# Patient Record
Sex: MALE | Race: BLACK OR AFRICAN AMERICAN | NOT HISPANIC OR LATINO | Employment: UNEMPLOYED | URBAN - NONMETROPOLITAN AREA
[De-identification: names, ages, dates, MRNs, and addresses within clinical notes are randomized per-mention and may not be internally consistent; named-entity substitution may affect disease eponyms.]

---

## 2022-11-25 ENCOUNTER — APPOINTMENT (EMERGENCY)
Dept: CT IMAGING | Facility: HOSPITAL | Age: 42
End: 2022-11-25

## 2022-11-25 ENCOUNTER — HOSPITAL ENCOUNTER (EMERGENCY)
Facility: HOSPITAL | Age: 42
Discharge: HOME | End: 2022-11-25
Attending: EMERGENCY MEDICINE

## 2022-11-25 VITALS
TEMPERATURE: 96.5 F | SYSTOLIC BLOOD PRESSURE: 139 MMHG | OXYGEN SATURATION: 97 % | DIASTOLIC BLOOD PRESSURE: 102 MMHG | RESPIRATION RATE: 18 BRPM | WEIGHT: 175 LBS | HEART RATE: 89 BPM

## 2022-11-25 DIAGNOSIS — Z78.9 ALCOHOL USE: ICD-10-CM

## 2022-11-25 DIAGNOSIS — K29.80 DUODENITIS: Primary | ICD-10-CM

## 2022-11-25 LAB
ALBUMIN SERPL BCP-MCNC: 3.2 G/DL (ref 3.5–5)
ALP SERPL-CCNC: 114 U/L (ref 46–116)
ALT SERPL W P-5'-P-CCNC: 105 U/L (ref 12–78)
ANION GAP SERPL CALCULATED.3IONS-SCNC: 10 MMOL/L (ref 4–13)
BASOPHILS # BLD AUTO: 0.05 THOUSANDS/ÂΜL (ref 0–0.1)
BASOPHILS NFR BLD AUTO: 1 % (ref 0–1)
BILIRUB SERPL-MCNC: 0.66 MG/DL (ref 0.2–1)
BUN SERPL-MCNC: 12 MG/DL (ref 5–25)
CALCIUM ALBUM COR SERPL-MCNC: 9.2 MG/DL (ref 8.3–10.1)
CALCIUM SERPL-MCNC: 8.6 MG/DL (ref 8.3–10.1)
CHLORIDE SERPL-SCNC: 100 MMOL/L (ref 96–108)
CO2 SERPL-SCNC: 24 MMOL/L (ref 21–32)
CREAT SERPL-MCNC: 1.14 MG/DL (ref 0.6–1.3)
EOSINOPHIL # BLD AUTO: 0.01 THOUSAND/ÂΜL (ref 0–0.61)
EOSINOPHIL NFR BLD AUTO: 0 % (ref 0–6)
ERYTHROCYTE [DISTWIDTH] IN BLOOD BY AUTOMATED COUNT: 14.6 % (ref 11.6–15.1)
ETHANOL SERPL-MCNC: <3 MG/DL (ref 0–3)
GFR SERPL CREATININE-BSD FRML MDRD: 79 ML/MIN/1.73SQ M
GLUCOSE SERPL-MCNC: 122 MG/DL (ref 65–140)
HCT VFR BLD AUTO: 40.5 % (ref 36.5–49.3)
HGB BLD-MCNC: 14 G/DL (ref 12–17)
HOLD SPECIMEN: NORMAL
IMM GRANULOCYTES # BLD AUTO: 0.01 THOUSAND/UL (ref 0–0.2)
IMM GRANULOCYTES NFR BLD AUTO: 0 % (ref 0–2)
LIPASE SERPL-CCNC: 391 U/L (ref 73–393)
LYMPHOCYTES # BLD AUTO: 1.9 THOUSANDS/ÂΜL (ref 0.6–4.47)
LYMPHOCYTES NFR BLD AUTO: 32 % (ref 14–44)
MCH RBC QN AUTO: 24.6 PG (ref 26.8–34.3)
MCHC RBC AUTO-ENTMCNC: 34.6 G/DL (ref 31.4–37.4)
MCV RBC AUTO: 71 FL (ref 82–98)
MONOCYTES # BLD AUTO: 0.65 THOUSAND/ÂΜL (ref 0.17–1.22)
MONOCYTES NFR BLD AUTO: 11 % (ref 4–12)
NEUTROPHILS # BLD AUTO: 3.41 THOUSANDS/ÂΜL (ref 1.85–7.62)
NEUTS SEG NFR BLD AUTO: 56 % (ref 43–75)
NRBC BLD AUTO-RTO: 0 /100 WBCS
PLATELET # BLD AUTO: 205 THOUSANDS/UL (ref 149–390)
PMV BLD AUTO: 10.7 FL (ref 8.9–12.7)
POTASSIUM SERPL-SCNC: 3.7 MMOL/L (ref 3.5–5.3)
PROT SERPL-MCNC: 7.8 G/DL (ref 6.4–8.4)
RBC # BLD AUTO: 5.69 MILLION/UL (ref 3.88–5.62)
SODIUM SERPL-SCNC: 134 MMOL/L (ref 135–147)
WBC # BLD AUTO: 6.03 THOUSAND/UL (ref 4.31–10.16)

## 2022-11-25 RX ORDER — MAGNESIUM HYDROXIDE/ALUMINUM HYDROXICE/SIMETHICONE 120; 1200; 1200 MG/30ML; MG/30ML; MG/30ML
30 SUSPENSION ORAL ONCE
Status: COMPLETED | OUTPATIENT
Start: 2022-11-25 | End: 2022-11-25

## 2022-11-25 RX ORDER — PANTOPRAZOLE SODIUM 40 MG/1
40 TABLET, DELAYED RELEASE ORAL DAILY
Qty: 30 TABLET | Refills: 0 | Status: SHIPPED | OUTPATIENT
Start: 2022-11-25

## 2022-11-25 RX ADMIN — ALUMINUM HYDROXIDE, MAGNESIUM HYDROXIDE, AND DIMETHICONE 30 ML: 200; 20; 200 SUSPENSION ORAL at 16:46

## 2022-11-25 RX ADMIN — IOHEXOL 100 ML: 350 INJECTION, SOLUTION INTRAVENOUS at 16:39

## 2022-11-25 NOTE — DISCHARGE INSTRUCTIONS
Please follow-up with primary care physician next available appointment  Take medications as directed and avoid excessive alcohol intake, cigarette smoking  Return to the emergency department if condition worsens   used

## 2022-11-26 NOTE — ED PROVIDER NOTES
History  Chief Complaint   Patient presents with   • Abdominal Pain     Abdominal pain into back for 1 week decreased appetite last alcoholic drink was yesterday 2100     Patient is a 72-year-old male presenting with generalized abdominal tenderness  Patient states symptoms have been present approximately 1 week  He states symptoms worsened when he consumes alcohol in large quantities  Patient states he has a few beers a few shots maybe every other day and has been doing that for quite some time  No previous abdominal surgeries  Had an episode of vomiting approximately 6 days ago  Denies fevers or chills  No diarrhea or constipation  No chest pain or shortness of breath  No headaches  Slight decrease in p o  Intake  None       History reviewed  No pertinent past medical history  History reviewed  No pertinent surgical history  History reviewed  No pertinent family history  I have reviewed and agree with the history as documented  E-Cigarette/Vaping   • E-Cigarette Use Never User      E-Cigarette/Vaping Substances     Social History     Tobacco Use   • Smoking status: Every Day     Types: Cigarettes   • Smokeless tobacco: Never   Vaping Use   • Vaping Use: Never used   Substance Use Topics   • Alcohol use: Yes     Comment: cedric;y last drink yesterday at 2100   • Drug use: Never       Review of Systems   Constitutional: Negative for activity change, appetite change, chills and fever  HENT: Negative for ear pain, hearing loss, rhinorrhea, sneezing, sore throat and trouble swallowing  Eyes: Negative for pain and visual disturbance  Respiratory: Negative for cough, choking, chest tightness, shortness of breath, wheezing and stridor  Cardiovascular: Negative for chest pain, palpitations and leg swelling  Gastrointestinal: Positive for abdominal pain  Negative for constipation, diarrhea, nausea and vomiting     Genitourinary: Negative for difficulty urinating, dysuria, frequency, hematuria and testicular pain  Musculoskeletal: Negative for arthralgias, back pain, gait problem and neck pain  Skin: Negative for color change and rash  Allergic/Immunologic: Negative for immunocompromised state  Neurological: Negative for dizziness, seizures, syncope, speech difficulty, weakness, light-headedness, numbness and headaches  Psychiatric/Behavioral: Negative for confusion  The patient is not nervous/anxious  All other systems reviewed and are negative  Physical Exam  Physical Exam  Vitals and nursing note reviewed  Constitutional:       General: He is not in acute distress  Appearance: He is well-developed, normal weight and well-nourished  He is not ill-appearing, toxic-appearing or diaphoretic  HENT:      Head: Normocephalic and atraumatic  Nose: Nose normal       Mouth/Throat:      Mouth: Oropharynx is clear and moist    Eyes:      General: No scleral icterus  Extraocular Movements: EOM normal       Conjunctiva/sclera: Conjunctivae normal    Cardiovascular:      Rate and Rhythm: Normal rate and regular rhythm  Heart sounds: Normal heart sounds  No murmur heard  Pulmonary:      Effort: Pulmonary effort is normal  No respiratory distress  Breath sounds: Normal breath sounds  No wheezing or rales  Chest:      Chest wall: No tenderness  Abdominal:      General: Bowel sounds are normal  There is no distension  Palpations: Abdomen is soft  There is no mass  Tenderness: There is abdominal tenderness in the right upper quadrant, epigastric area and left upper quadrant  There is no right CVA tenderness, left CVA tenderness, guarding or rebound  Negative signs include Jiménez's sign and McBurney's sign  Hernia: No hernia is present  Musculoskeletal:         General: No swelling, tenderness, deformity or edema  Normal range of motion  Cervical back: Normal range of motion and neck supple     Lymphadenopathy:      Cervical: No cervical adenopathy  Skin:     General: Skin is warm and dry  Capillary Refill: Capillary refill takes less than 2 seconds  Coloration: Skin is not mottled or pale  Findings: No erythema or rash  Neurological:      General: No focal deficit present  Mental Status: He is alert and oriented to person, place, and time  Motor: No abnormal muscle tone  Psychiatric:         Mood and Affect: Mood and affect and mood normal          Behavior: Behavior normal          Vital Signs  ED Triage Vitals [11/25/22 1457]   Temperature Pulse Respirations Blood Pressure SpO2   (!) 96 5 °F (35 8 °C) 85 18 165/100 99 %      Temp Source Heart Rate Source Patient Position - Orthostatic VS BP Location FiO2 (%)   Temporal Monitor Sitting Right arm --      Pain Score       8           Vitals:    11/25/22 1457 11/25/22 1551 11/25/22 1700 11/25/22 1730   BP: 165/100 165/100 143/99 (!) 139/102   Pulse: 85 85 86 89   Patient Position - Orthostatic VS: Sitting  Sitting Sitting         Visual Acuity      ED Medications  Medications   aluminum-magnesium hydroxide-simethicone (MYLANTA) oral suspension 30 mL (30 mL Oral Given 11/25/22 1646)   iohexol (OMNIPAQUE) 350 MG/ML injection (SINGLE-DOSE) 100 mL (100 mL Intravenous Given 11/25/22 1639)       Diagnostic Studies  Results Reviewed     Procedure Component Value Units Date/Time    [place] draw [[id number]] Collected: 11/25/22 1543    Lab Status: Final result Specimen: Blood from Arm, Right Updated: 11/25/22 1701    Narrative: The following orders were created for panel order [place] draw    Procedure                               Abnormality         Status                     ---------                               -----------         ------                     Charlett [place] Top on LOWS[[id number]]                           Final result               Gold top on QZZM[[id number]]                                 Final result               Green / Black tube on OSGT[[id number]] Final result                 Please view results for these tests on the individual orders      Ethanol [[id number]]  (Normal) Collected: 11/25/22 1543    Lab Status: Final result Specimen: Blood from Arm, Right Updated: 11/25/22 1625     Ethanol Lvl <3 mg/dL     Comprehensive metabolic panel [[id number]]  (Abnormal) Collected: 11/25/22 1543    Lab Status: Final result Specimen: Blood from Arm, Right Updated: 11/25/22 1617     Sodium 134 mmol/L      Potassium 3 7 mmol/L      Chloride 100 mmol/L      CO2 24 mmol/L      ANION GAP 10 mmol/L      BUN 12 mg/dL      Creatinine 1 14 mg/dL      Glucose 122 mg/dL      Calcium 8 6 mg/dL      Corrected Calcium 9 2 mg/dL      AST --      U/L      Alkaline Phosphatase 114 U/L      Total Protein 7 8 g/dL      Albumin 3 2 g/dL      Total Bilirubin 0 66 mg/dL      eGFR 79 ml/min/1 73sq m     Narrative:      [place] guidelines for Chronic Kidney Disease (CKD):   •  Stage 1 with normal or high GFR (GFR > 90 mL/min/1 73 square meters)  •  Stage 2 Mild CKD (GFR = 60-89 mL/min/1 73 square meters)  •  Stage 3A Moderate CKD (GFR = 45-59 mL/min/1 73 square meters)  •  Stage 3B Moderate CKD (GFR = 30-44 mL/min/1 73 square meters)  •  Stage 4 Severe CKD (GFR = 15-29 mL/min/1 73 square meters)  •  Stage 5 End Stage CKD (GFR <15 mL/min/1 73 square meters)  Note: GFR calculation is accurate only with a steady state creatinine    Lipase [[id number]]  (Normal) Collected: 11/25/22 1543    Lab Status: Final result Specimen: Blood from Arm, Right Updated: 11/25/22 1612     Lipase 391 u/L     CBC and differential [[id number]]  (Abnormal) Collected: 11/25/22 1543    Lab Status: Final result Specimen: Blood from Arm, Right Updated: 11/25/22 1552     WBC 6 03 Thousand/uL      RBC 5 69 Million/uL      Hemoglobin 14 0 g/dL      Hematocrit 40 5 %      MCV 71 fL      MCH 24 6 pg      MCHC 34 6 g/dL      RDW 14 6 %      MPV 10 7 fL      Platelets 824 Thousands/uL nRBC 0 /100 WBCs      Neutrophils Relative 56 %      Immat GRANS % 0 %      Lymphocytes Relative 32 %      Monocytes Relative 11 %      Eosinophils Relative 0 %      Basophils Relative 1 %      Neutrophils Absolute 3 41 Thousands/µL      Immature Grans Absolute 0 01 Thousand/uL      Lymphocytes Absolute 1 90 Thousands/µL      Monocytes Absolute 0 65 Thousand/µL      Eosinophils Absolute 0 01 Thousand/µL      Basophils Absolute 0 05 Thousands/µL                  CT abdomen pelvis with contrast   Final Result by Rodney Santos MD (11/25 1654)      Mild wall thickening and surrounding inflammation at the junction of the 2nd and 3rd portions of duodenum suspicious for underlying duodenitis  Hepatic steatosis  Workstation performed: [id number]                    Procedures  Procedures         ED Course                                             MDM    Disposition  Final diagnoses:   Duodenitis   Alcohol use     Time reflects when diagnosis was documented in both MDM as applicable and the Disposition within this note     Time User Action Codes Description Comment    11/25/2022  6:17 PM Chad SUAZO Add [K29 80] Duodenitis     11/26/2022  7:50 AM Shar Reyes Add [Z78 9] Alcohol use       ED Disposition     ED Disposition   Discharge    Condition   Stable    Date/Time   Fri Nov 25, 2022  6:17 PM    Comment   Jeanne Cates discharge to home/self care  Follow-up Information    None         Discharge Medication List as of 11/25/2022  6:19 PM      START taking these medications    Details   aluminum-magnesium hydroxide 200-200 MG/5ML suspension Take 15 mL by mouth every 6 (six) hours as needed for heartburn or indigestion, Starting Fri 11/25/2022, Until Sun 12/25/2022 at 2359, Normal      pantoprazole (PROTONIX) 40 mg tablet Take 1 tablet (40 mg total) by mouth daily, Starting Fri 11/25/2022, Normal             No discharge procedures on file      PDMP Review     None          ED Provider  Electronically Signed by           Jluis Wallace, DO  11/26/22 8222

## 2023-06-11 ENCOUNTER — OFFICE VISIT (OUTPATIENT)
Dept: URGENT CARE | Facility: CLINIC | Age: 43
End: 2023-06-11
Payer: COMMERCIAL

## 2023-06-11 VITALS
HEIGHT: 72 IN | DIASTOLIC BLOOD PRESSURE: 86 MMHG | BODY MASS INDEX: 23.38 KG/M2 | SYSTOLIC BLOOD PRESSURE: 126 MMHG | RESPIRATION RATE: 17 BRPM | TEMPERATURE: 98 F | HEART RATE: 100 BPM | WEIGHT: 172.6 LBS | OXYGEN SATURATION: 98 %

## 2023-06-11 DIAGNOSIS — S05.52XA FOREIGN BODY IN LENS OF LEFT EYE, INITIAL ENCOUNTER: Primary | ICD-10-CM

## 2023-06-11 DIAGNOSIS — S05.02XA INJ CONJUNCTIVA AND CORNEAL ABRASION W/O FB, LEFT EYE, INIT: ICD-10-CM

## 2023-06-11 PROCEDURE — 99203 OFFICE O/P NEW LOW 30 MIN: CPT | Performed by: PHYSICIAN ASSISTANT

## 2023-06-11 PROCEDURE — 65220 REMOVE FOREIGN BODY FROM EYE: CPT | Performed by: PHYSICIAN ASSISTANT

## 2023-06-11 RX ORDER — CIPROFLOXACIN HYDROCHLORIDE 3.5 MG/ML
SOLUTION/ DROPS TOPICAL
Qty: 5 ML | Refills: 0 | Status: SHIPPED | OUTPATIENT
Start: 2023-06-11

## 2023-06-11 NOTE — PATIENT INSTRUCTIONS
Corneal Abrasion   WHAT YOU NEED TO KNOW:   A corneal abrasion is a scratch on the cornea of your eye  The cornea is the clear layer that covers the front of your eye  A small scratch may heal in 1 to 2 days  Deeper or larger scratches may take longer to heal         DISCHARGE INSTRUCTIONS:   Call your doctor or ophthalmologist if:   Your eye pain or vision gets worse  You have yellow or green drainage from your eye  You have questions or concerns about your condition or care  Medicines:   Medicines  may be given in the form of eyedrops or ointment to help prevent an eye infection  You may also be given eyedrops to decrease pain  Take your medicine as directed  Contact your healthcare provider if you think your medicine is not helping or if you have side effects  Tell your provider if you are allergic to any medicine  Keep a list of the medicines, vitamins, and herbs you take  Include the amounts, and when and why you take them  Bring the list or the pill bottles to follow-up visits  Carry your medicine list with you in case of an emergency  Care for your eyes:   Get regular eye exams  Get your eyes checked at least every year  Eat healthy foods  Fresh fruits and vegetables that are rich in vitamins A and C may help with your vision  Foods such as sweet potatoes, apricots, and carrots are rich in nutrients for the eyes  Take care of your contacts or glasses  Store, clean, and use your contacts or glasses as directed  Replace your glasses or contact lenses as often as your healthcare provider suggests  Decrease eye strain  Rest your eyes, especially after you read or use a computer for long periods of time  Get plenty of sleep at night  Use lights that reduce glare in your home, school, or workplace  Wear dark sunglasses  This will help prevent pain and light sensitivity  Make sure the sunglasses have UVA and UVB protection   This will protect your eyes when you go outside  Use eyedrops safely  If your treatment plan includes eyedrops, it is important to use them as directed  Your provider may give you detailed instructions to follow  The eyedrops may also come with safety instructions  Follow all instructions to help prevent an infection  Do not touch the tip of the bottle to your eye  Germs from your eye can spread to the medicine bottle  Help prevent corneal abrasions:   Remove your contact lenses if your eyes feel dry or irritated  Wash your hands if you need to touch your eyes or your face  Trim your fingernails so you cannot scratch your eye  Wear protective eyewear when you work with chemicals, wood, dust, or metal     Wear protective eyewear when you play sports  Do not wear your contacts for longer than you should  Do not sleep with your contacts in  Do not wear glitter makeup  Glitter can easily get into your eyes and under contact lenses  Follow up with your doctor or ophthalmologist as directed:  Write down your questions so you remember to ask them during your visits  © Copyright [employer] 2022 Information is for End User's use only and may not be sold, redistributed or otherwise used for commercial purposes  The above information is an  only  It is not intended as medical advice for individual conditions or treatments  Talk to your doctor, nurse or pharmacist before following any medical regimen to see if it is safe and effective for you

## 2023-06-11 NOTE — PROGRESS NOTES
[facility]        NAME: Rob Hargrove is a 38 y o  male  : 1980    MRN: [id number]  DATE: 2023  TIME: 4:12 PM    Assessment and Plan   Foreign body in lens of left eye, initial encounter [S05 [id number]]  1  Foreign body in lens of left eye, initial encounter  ciprofloxacin (CILOXAN) 0 3 % ophthalmic solution      2  Inj conjunctiva and corneal abrasion w/o fb, left eye, init              Patient Instructions   Patient Instructions     Corneal Abrasion   WHAT YOU NEED TO KNOW:   A corneal abrasion is a scratch on the cornea of your eye  The cornea is the clear layer that covers the front of your eye  A small scratch may heal in 1 to 2 days  Deeper or larger scratches may take longer to heal         DISCHARGE INSTRUCTIONS:   Call your doctor or ophthalmologist if:   • Your eye pain or vision gets worse  • You have yellow or green drainage from your eye  • You have questions or concerns about your condition or care  Medicines:   • Medicines  may be given in the form of eyedrops or ointment to help prevent an eye infection  You may also be given eyedrops to decrease pain  • Take your medicine as directed  Contact your healthcare provider if you think your medicine is not helping or if you have side effects  Tell your provider if you are allergic to any medicine  Keep a list of the medicines, vitamins, and herbs you take  Include the amounts, and when and why you take them  Bring the list or the pill bottles to follow-up visits  Carry your medicine list with you in case of an emergency  Care for your eyes:   • Get regular eye exams  Get your eyes checked at least every year  • Eat healthy foods  Fresh fruits and vegetables that are rich in vitamins A and C may help with your vision  Foods such as sweet potatoes, apricots, and carrots are rich in nutrients for the eyes  • Take care of your contacts or glasses  Store, clean, and use your contacts or glasses as directed  Replace your glasses or contact lenses as often as your healthcare provider suggests  • Decrease eye strain  Rest your eyes, especially after you read or use a computer for long periods of time  Get plenty of sleep at night  Use lights that reduce glare in your home, school, or workplace  • Wear dark sunglasses  This will help prevent pain and light sensitivity  Make sure the sunglasses have UVA and UVB protection  This will protect your eyes when you go outside  • Use eyedrops safely  If your treatment plan includes eyedrops, it is important to use them as directed  Your provider may give you detailed instructions to follow  The eyedrops may also come with safety instructions  Follow all instructions to help prevent an infection  Do not touch the tip of the bottle to your eye  Germs from your eye can spread to the medicine bottle  Help prevent corneal abrasions:   • Remove your contact lenses if your eyes feel dry or irritated  • Wash your hands if you need to touch your eyes or your face  • Trim your fingernails so you cannot scratch your eye  • Wear protective eyewear when you work with chemicals, wood, dust, or metal     • Wear protective eyewear when you play sports  • Do not wear your contacts for longer than you should  • Do not sleep with your contacts in  • Do not wear glitter makeup  Glitter can easily get into your eyes and under contact lenses  Follow up with your doctor or ophthalmologist as directed:  Write down your questions so you remember to ask them during your visits  © Copyright [employer] 2022 Information is for End User's use only and may not be sold, redistributed or otherwise used for commercial purposes  The above information is an  only  It is not intended as medical advice for individual conditions or treatments   Talk to your doctor, nurse or pharmacist before following any medical regimen to see if it is safe and effective for you           Follow up with PCP in 3-5 days  Proceed to  ER if symptoms worsen  Chief Complaint     Chief Complaint   Patient presents with   • Foreign Body in Eye     Reports cleaning grill yesterday at around 3pm, reports feeling as if something got inside eye  Appears swollen and some redness  Woke up this morning with it shut, denies any discharge  Has been using otc ibuprofen, reports blurred vision, unable to keep open due to pain  History of Present Illness       The patient presents to the clinic for a foreign body feeling in his left eye  He states he was cleaning the grill yesterday when he felt something go into his eye  He states that he tried to wash his eye out with water splashing his eye last night and used wetting drops  He states that when he woke up this morning he had increased pain and swelling of his eye and he is unable to open his eye due to the pain  Review of Systems   Review of Systems   Constitutional: Negative for chills and fever  Eyes: Positive for pain and redness  Foreign body feeling of the left eye as noted above         Current Medications       Current Outpatient Medications:   •  ciprofloxacin (CILOXAN) 0 3 % ophthalmic solution, 1 gtt left eye every 2 hours for 2 days,t hen 1 gtt every 4 hours for 5 days, Disp: 5 mL, Rfl: 0  •  pantoprazole (PROTONIX) 40 mg tablet, Take 1 tablet (40 mg total) by mouth daily (Patient not taking: Reported on 6/11/2023), Disp: 30 tablet, Rfl: 0    Current Allergies     Allergies as of 06/11/2023   • (No Known Allergies)            The following portions of the patient's history were reviewed and updated as appropriate: allergies, current medications, past family history, past medical history, past social history, past surgical history and problem list      Past Medical History:   Diagnosis Date   • GERD (gastroesophageal reflux disease)    • Hypertension        History reviewed   No pertinent surgical history  History reviewed  No pertinent family history  Medications have been verified  Objective   /86   Pulse 100   Temp 98 °F (36 7 °C)   Resp 17   Ht 6' (1 829 m)   Wt 78 3 kg (172 lb 9 6 oz)   SpO2 98%   BMI 23 41 kg/m²        Physical Exam     Physical Exam  HENT:      Right Ear: Tympanic membrane normal    Neurological:      Mental Status: He is alert  Universal Protocol:  Consent: Verbal consent obtained  Risks and benefits: risks, benefits and alternatives were discussed  Consent given by: patient  Patient understanding: patient states understanding of the procedure being performed  Patient identity confirmed: verbally with patient    Foreign body removal    Date/Time: 6/11/2023 3:30 PM    Performed by: Tejal Reynaga PA-C  Authorized by: Tejal Reynaga PA-C  Body area: eye  Anesthesia: local infiltration    Anesthesia:  Local Anesthetic: tetracaine drops  Anesthetic total: 2 mL    Sedation:  Patient sedated: no  Patient restrained: no  Localization method: eyelid eversion and visualized  Removal mechanism: eyelid eversion and moist cotton swab  Eye examined with fluorescein  Fluorescein uptake  Corneal abrasion size: small  Corneal abrasion location: lateral  No residual rust ring present  Depth: superficial  1 objects recovered  Objects recovered:  1 Small metal object  Post-procedure assessment: foreign body removed  Patient tolerance: patient tolerated the procedure well with no immediate complications  Comments: Upon further exam there was a corneal abrasion noted at approximately 1 PM   The patient's eye was then irrigated using 30 mL of saline eyewash  -Patient was given Cipro ophthalmic drops for infection control  I suggest he follow-up with an eye doctor if his symptoms fail to improve in the next 3 days